# Patient Record
Sex: MALE | Race: BLACK OR AFRICAN AMERICAN | NOT HISPANIC OR LATINO | Employment: FULL TIME | URBAN - METROPOLITAN AREA
[De-identification: names, ages, dates, MRNs, and addresses within clinical notes are randomized per-mention and may not be internally consistent; named-entity substitution may affect disease eponyms.]

---

## 2023-06-06 ENCOUNTER — OFFICE VISIT (OUTPATIENT)
Dept: PRIMARY CARE | Facility: CLINIC | Age: 63
End: 2023-06-06
Payer: COMMERCIAL

## 2023-06-06 VITALS
BODY MASS INDEX: 36.87 KG/M2 | OXYGEN SATURATION: 97 % | WEIGHT: 272.2 LBS | SYSTOLIC BLOOD PRESSURE: 131 MMHG | HEART RATE: 79 BPM | TEMPERATURE: 97.1 F | DIASTOLIC BLOOD PRESSURE: 87 MMHG | HEIGHT: 72 IN

## 2023-06-06 DIAGNOSIS — N52.9 ERECTILE DYSFUNCTION, UNSPECIFIED ERECTILE DYSFUNCTION TYPE: ICD-10-CM

## 2023-06-06 DIAGNOSIS — E78.5 DYSLIPIDEMIA: ICD-10-CM

## 2023-06-06 DIAGNOSIS — J30.9 ALLERGIC RHINITIS, UNSPECIFIED SEASONALITY, UNSPECIFIED TRIGGER: ICD-10-CM

## 2023-06-06 DIAGNOSIS — I10 HYPERTENSION, UNSPECIFIED TYPE: Primary | ICD-10-CM

## 2023-06-06 PROCEDURE — 3075F SYST BP GE 130 - 139MM HG: CPT | Performed by: STUDENT IN AN ORGANIZED HEALTH CARE EDUCATION/TRAINING PROGRAM

## 2023-06-06 PROCEDURE — 1036F TOBACCO NON-USER: CPT | Performed by: STUDENT IN AN ORGANIZED HEALTH CARE EDUCATION/TRAINING PROGRAM

## 2023-06-06 PROCEDURE — 99213 OFFICE O/P EST LOW 20 MIN: CPT | Performed by: STUDENT IN AN ORGANIZED HEALTH CARE EDUCATION/TRAINING PROGRAM

## 2023-06-06 PROCEDURE — 3079F DIAST BP 80-89 MM HG: CPT | Performed by: STUDENT IN AN ORGANIZED HEALTH CARE EDUCATION/TRAINING PROGRAM

## 2023-06-06 RX ORDER — FLUTICASONE PROPIONATE 50 MCG
2 SPRAY, SUSPENSION (ML) NASAL DAILY
Qty: 16 G | Refills: 6 | Status: SHIPPED | OUTPATIENT
Start: 2023-06-06 | End: 2024-06-05

## 2023-06-06 RX ORDER — LOSARTAN POTASSIUM AND HYDROCHLOROTHIAZIDE 25; 100 MG/1; MG/1
1 TABLET ORAL DAILY
Qty: 90 TABLET | Refills: 3 | Status: SHIPPED | OUTPATIENT
Start: 2023-06-06 | End: 2024-06-05

## 2023-06-06 RX ORDER — ATORVASTATIN CALCIUM 40 MG/1
40 TABLET, FILM COATED ORAL DAILY
Qty: 90 TABLET | Refills: 3 | Status: SHIPPED | OUTPATIENT
Start: 2023-06-06 | End: 2024-06-05

## 2023-06-06 RX ORDER — TADALAFIL 20 MG/1
20 TABLET ORAL DAILY PRN
Qty: 90 TABLET | Refills: 3 | Status: SHIPPED | OUTPATIENT
Start: 2023-06-06 | End: 2024-06-05

## 2023-06-06 RX ORDER — SILDENAFIL CITRATE 20 MG/1
20 TABLET ORAL DAILY
Qty: 90 TABLET | Refills: 3 | Status: SHIPPED | OUTPATIENT
Start: 2023-06-06 | End: 2024-06-05

## 2023-06-06 RX ORDER — PRAVASTATIN SODIUM 40 MG/1
1 TABLET ORAL NIGHTLY
COMMUNITY
Start: 2020-03-26 | End: 2023-06-06

## 2023-06-06 RX ORDER — CETIRIZINE HYDROCHLORIDE 10 MG/1
10 TABLET ORAL DAILY
Qty: 90 TABLET | Refills: 3 | Status: SHIPPED | OUTPATIENT
Start: 2023-06-06 | End: 2024-06-05

## 2023-06-06 RX ORDER — LOSARTAN POTASSIUM AND HYDROCHLOROTHIAZIDE 25; 100 MG/1; MG/1
1 TABLET ORAL DAILY
COMMUNITY
Start: 2023-02-04 | End: 2023-06-06 | Stop reason: SDUPTHER

## 2023-06-06 ASSESSMENT — PAIN SCALES - GENERAL: PAINLEVEL: 0-NO PAIN

## 2023-06-06 NOTE — PROGRESS NOTES
Subjective   Patient ID: Justin Arango is a 62 y.o. male who presents for Med Refill.    HPI   Patient is here today for a medication refill: Sildenafil, tadalafil, losartan/hydrochlorothiazide, and pravastatin   And he ais also complaining of Sx of nasal drainage     # Nasal drainage:   Sx started past 2 months   Runny nose, congestion, itching   Tried OTC allegra with partial improvement     Review of Systems  Review of systems is negative besides what is mentioned in the HPI      Objective   /87 (BP Location: Left arm, Patient Position: Sitting, BP Cuff Size: Adult long)   Pulse 79   Temp 36.2 °C (97.1 °F) (Temporal)   Ht 1.829 m (6')   Wt 123 kg (272 lb 3.2 oz)   SpO2 97%   BMI 36.92 kg/m²     Physical Exam  General: Alert and oriented*3, not in acute distress   Cardiac: S1, S2 normal, no murmurs, no lower extremity edema.  Respiratory: CTAB, no wheezing or crackles   HEENT: PEERLA, nose with with pinks mucosa, swollen turbinates    Psych: appropriate mood and affect to the clinical setting      Assessment/Plan   Mr. Arango is 62 years old who is here today for medication refills and to address the issue of nasal drainage concerning for allergic rhinitis.     # AR:   - start Flonase, and Loratadine     # Medication refills:   - discussed refilling Sildenfil and tadalafil and advised patient that he should never take together   - however I refilled both meds mally patient states that he takes both medications in alternation for benefit   - other meds refilled per patient request     Patient's HPI, physical exam and plan of care was discussed with the attending physician Dr. Julissa Olivarez MD  Family Medicine, PGY-3  [facility]

## 2023-06-08 NOTE — PROGRESS NOTES
I reviewed with the resident the medical history and the resident’s findings on physical examination.  I discussed with the resident the patient’s diagnosis and concur with the treatment plan as documented in the resident note.     Darryl Costa MD

## 2023-07-17 ENCOUNTER — OFFICE VISIT (OUTPATIENT)
Dept: PRIMARY CARE | Facility: CLINIC | Age: 63
End: 2023-07-17
Payer: COMMERCIAL

## 2023-07-17 VITALS
DIASTOLIC BLOOD PRESSURE: 98 MMHG | HEIGHT: 72 IN | HEART RATE: 96 BPM | OXYGEN SATURATION: 98 % | SYSTOLIC BLOOD PRESSURE: 133 MMHG | BODY MASS INDEX: 36.98 KG/M2 | WEIGHT: 273 LBS | TEMPERATURE: 97.3 F

## 2023-07-17 DIAGNOSIS — J01.90 ACUTE BACTERIAL SINUSITIS: Primary | ICD-10-CM

## 2023-07-17 DIAGNOSIS — B96.89 ACUTE BACTERIAL SINUSITIS: Primary | ICD-10-CM

## 2023-07-17 PROBLEM — G47.00 INSOMNIA: Status: ACTIVE | Noted: 2023-07-17

## 2023-07-17 PROBLEM — N52.9 ERECTILE DYSFUNCTION: Status: ACTIVE | Noted: 2023-07-17

## 2023-07-17 PROBLEM — J30.9 ALLERGIC RHINITIS: Status: ACTIVE | Noted: 2023-07-17

## 2023-07-17 PROBLEM — K59.00 CONSTIPATION: Status: ACTIVE | Noted: 2023-07-17

## 2023-07-17 PROBLEM — E78.5 HYPERLIPIDEMIA: Status: ACTIVE | Noted: 2023-07-17

## 2023-07-17 PROBLEM — N42.9 PROSTATE DISORDER: Status: ACTIVE | Noted: 2023-07-17

## 2023-07-17 PROBLEM — L30.9 ECZEMA: Status: ACTIVE | Noted: 2023-07-17

## 2023-07-17 PROBLEM — I10 HYPERTENSION: Status: ACTIVE | Noted: 2023-07-17

## 2023-07-17 PROCEDURE — 3080F DIAST BP >= 90 MM HG: CPT

## 2023-07-17 PROCEDURE — 1036F TOBACCO NON-USER: CPT

## 2023-07-17 PROCEDURE — 3075F SYST BP GE 130 - 139MM HG: CPT

## 2023-07-17 PROCEDURE — 99213 OFFICE O/P EST LOW 20 MIN: CPT

## 2023-07-17 RX ORDER — AMOXICILLIN AND CLAVULANATE POTASSIUM 875; 125 MG/1; MG/1
875 TABLET, FILM COATED ORAL 2 TIMES DAILY
Qty: 14 TABLET | Refills: 0 | Status: SHIPPED | OUTPATIENT
Start: 2023-07-17 | End: 2023-07-24

## 2023-07-17 ASSESSMENT — ENCOUNTER SYMPTOMS
CARDIOVASCULAR NEGATIVE: 1
ENDOCRINE NEGATIVE: 1
RHINORRHEA: 0
VOICE CHANGE: 0
RESPIRATORY NEGATIVE: 1
MUSCULOSKELETAL NEGATIVE: 1
GASTROINTESTINAL NEGATIVE: 1
CONSTITUTIONAL NEGATIVE: 1
TROUBLE SWALLOWING: 0
SORE THROAT: 0
ALLERGIC/IMMUNOLOGIC NEGATIVE: 1
HEMATOLOGIC/LYMPHATIC NEGATIVE: 1
FACIAL SWELLING: 0
PSYCHIATRIC NEGATIVE: 1
SINUS PRESSURE: 1
SINUS PAIN: 0
NEUROLOGICAL NEGATIVE: 1

## 2023-07-17 ASSESSMENT — PAIN SCALES - GENERAL: PAINLEVEL: 0-NO PAIN

## 2023-07-17 NOTE — PROGRESS NOTES
Subjective   Patient ID: Justin Arango is a 62 y.o. male who presents for Follow-up (Sinus injection).    HPI   Thinks has sinus infection, gotten progressively worse since was last seen for allergic rhinitis 1 months  - Congestion that unable to get rid of with medications   - blowing nose more often with production of thick yellow sputum/mucus   - had headache yesterday took aleve and helped   - denies fever/chills  - sweating from face more    Review of Systems   Constitutional: Negative.    HENT:  Positive for congestion, sinus pressure and sneezing. Negative for dental problem, drooling, ear discharge, ear pain, facial swelling, hearing loss, mouth sores, nosebleeds, postnasal drip, rhinorrhea, sinus pain, sore throat, tinnitus, trouble swallowing and voice change.    Respiratory: Negative.     Cardiovascular: Negative.    Gastrointestinal: Negative.    Endocrine: Negative.    Genitourinary: Negative.    Musculoskeletal: Negative.    Skin: Negative.    Allergic/Immunologic: Negative.    Neurological: Negative.    Hematological: Negative.    Psychiatric/Behavioral: Negative.         Objective   BP (!) 133/98 (BP Location: Right arm, Patient Position: Sitting, BP Cuff Size: Adult long)   Pulse 96   Temp 36.3 °C (97.3 °F) (Temporal)   Ht 1.829 m (6')   Wt 124 kg (273 lb)   SpO2 98%   BMI 37.03 kg/m²       Physical Exam  Constitutional:       Appearance: He is obese.   HENT:      Head: Normocephalic and atraumatic.      Right Ear: External ear normal.      Left Ear: External ear normal.      Nose: Congestion present. No nasal tenderness.      Right Nostril: No foreign body, epistaxis, septal hematoma or occlusion.      Left Nostril: No foreign body, epistaxis, septal hematoma or occlusion.      Right Sinus: No maxillary sinus tenderness or frontal sinus tenderness.      Left Sinus: No maxillary sinus tenderness or frontal sinus tenderness.      Mouth/Throat:      Lips: Pink.      Mouth: Mucous membranes are  moist.      Pharynx: Oropharynx is clear. Uvula midline. No pharyngeal swelling, oropharyngeal exudate, posterior oropharyngeal erythema or uvula swelling.   Eyes:      Conjunctiva/sclera: Conjunctivae normal.   Cardiovascular:      Rate and Rhythm: Normal rate and regular rhythm.      Pulses: Normal pulses.      Heart sounds: Normal heart sounds.   Pulmonary:      Effort: Pulmonary effort is normal.      Breath sounds: Normal breath sounds.   Musculoskeletal:      Cervical back: Normal range of motion and neck supple.   Neurological:      General: No focal deficit present.      Mental Status: He is alert and oriented to person, place, and time.   Psychiatric:         Mood and Affect: Mood normal.         Behavior: Behavior normal.         Thought Content: Thought content normal.         Judgment: Judgment normal.       Assessment/Plan   Justin Arango is 61 y/o M w/ HTN, HLD, Eczema, ED, and allergic rhinitis who was determined to have Acute bacterial sinusitis (worsening sinusitis and productive of yellow mucus/sputmu) and was started on Augmentin therapy for 7 day (14 doses).  Plan below    Diagnoses and all orders for this visit:  Acute bacterial sinusitis  -     amoxicillin-pot clavulanate (Augmentin) 875-125 mg tablet; Take 1 tablet (875 mg) by mouth 2 times a day for 7 days.  Other orders  -     Follow Up In Primary Care - Established; Future    *Patient was found to have elevated Diastolic blood pressure, which was note and discussed with the patient.  Patient was asymptomatic and this was most likely secondary to his acute concern of ABS, however plan on following up with the patient in 1 month for BP check.  If continues to be elevated, then can consider work up for LIZETH along with adjustment of blood pressure medications.    **RTC in 1 month for BP check    Patient was discussed with Dr. Aaron Aviles MD MS  PGY-2 Family Medicine   [facility]

## 2023-07-18 NOTE — PROGRESS NOTES
I reviewed the resident's documentation and discussed the patient with the resident. I agree with the resident's medical decision making as documented in the note.     Claudia Walker MD

## 2024-05-10 ENCOUNTER — APPOINTMENT (OUTPATIENT)
Dept: PRIMARY CARE | Facility: CLINIC | Age: 64
End: 2024-05-10
Payer: COMMERCIAL

## 2024-07-03 ENCOUNTER — TELEPHONE (OUTPATIENT)
Dept: PRIMARY CARE | Facility: CLINIC | Age: 64
End: 2024-07-03

## 2024-07-03 DIAGNOSIS — I10 HYPERTENSION, UNSPECIFIED TYPE: ICD-10-CM

## 2024-07-03 DIAGNOSIS — E78.5 DYSLIPIDEMIA: ICD-10-CM

## 2024-07-03 RX ORDER — LOSARTAN POTASSIUM AND HYDROCHLOROTHIAZIDE 25; 100 MG/1; MG/1
1 TABLET ORAL DAILY
Qty: 90 TABLET | Refills: 0 | Status: SHIPPED | OUTPATIENT
Start: 2024-07-03 | End: 2024-10-01

## 2024-07-03 RX ORDER — ATORVASTATIN CALCIUM 40 MG/1
40 TABLET, FILM COATED ORAL DAILY
Qty: 90 TABLET | Refills: 0 | Status: SHIPPED | OUTPATIENT
Start: 2024-07-03 | End: 2024-10-01

## 2024-07-03 NOTE — TELEPHONE ENCOUNTER
Pt came in asking for a refill on their bp med [facility]. [phone] thank you!

## 2024-07-03 NOTE — TELEPHONE ENCOUNTER
Sent in refill of the patient's blood pressure medication and atorvastatin.  If someone would call the patient and schedule an appointment that would be great.  He has not been seen since last summer, and should follow up on his chronic medical conditions.    HIRA Aviles MD MS  PGY-3 Family Medicine

## 2024-09-12 ENCOUNTER — OFFICE VISIT (OUTPATIENT)
Dept: PRIMARY CARE | Facility: HOSPITAL | Age: 64
End: 2024-09-12
Payer: COMMERCIAL

## 2024-09-12 VITALS
HEART RATE: 97 BPM | WEIGHT: 267 LBS | OXYGEN SATURATION: 97 % | DIASTOLIC BLOOD PRESSURE: 87 MMHG | TEMPERATURE: 97.1 F | HEIGHT: 72 IN | SYSTOLIC BLOOD PRESSURE: 137 MMHG | BODY MASS INDEX: 36.16 KG/M2

## 2024-09-12 DIAGNOSIS — J30.9 ALLERGIC RHINITIS, UNSPECIFIED SEASONALITY, UNSPECIFIED TRIGGER: ICD-10-CM

## 2024-09-12 DIAGNOSIS — I10 HYPERTENSION, UNSPECIFIED TYPE: ICD-10-CM

## 2024-09-12 DIAGNOSIS — N52.9 ERECTILE DYSFUNCTION, UNSPECIFIED ERECTILE DYSFUNCTION TYPE: ICD-10-CM

## 2024-09-12 PROCEDURE — 3079F DIAST BP 80-89 MM HG: CPT

## 2024-09-12 PROCEDURE — 99213 OFFICE O/P EST LOW 20 MIN: CPT | Mod: GC

## 2024-09-12 PROCEDURE — 3008F BODY MASS INDEX DOCD: CPT

## 2024-09-12 PROCEDURE — 3075F SYST BP GE 130 - 139MM HG: CPT

## 2024-09-12 PROCEDURE — 1036F TOBACCO NON-USER: CPT

## 2024-09-12 PROCEDURE — 99213 OFFICE O/P EST LOW 20 MIN: CPT

## 2024-09-12 RX ORDER — TADALAFIL 20 MG/1
20 TABLET ORAL DAILY PRN
Qty: 90 TABLET | Refills: 3 | Status: SHIPPED | OUTPATIENT
Start: 2024-09-12 | End: 2025-09-12

## 2024-09-12 RX ORDER — BENZONATATE 100 MG/1
100 CAPSULE ORAL 3 TIMES DAILY PRN
Qty: 42 CAPSULE | Refills: 0 | Status: SHIPPED | OUTPATIENT
Start: 2024-09-12 | End: 2024-10-12

## 2024-09-12 RX ORDER — FLUTICASONE PROPIONATE 50 MCG
2 SPRAY, SUSPENSION (ML) NASAL DAILY
Qty: 16 G | Refills: 6 | Status: SHIPPED | OUTPATIENT
Start: 2024-09-12 | End: 2025-09-12

## 2024-09-12 ASSESSMENT — COLUMBIA-SUICIDE SEVERITY RATING SCALE - C-SSRS
6. HAVE YOU EVER DONE ANYTHING, STARTED TO DO ANYTHING, OR PREPARED TO DO ANYTHING TO END YOUR LIFE?: NO
2. HAVE YOU ACTUALLY HAD ANY THOUGHTS OF KILLING YOURSELF?: NO
1. IN THE PAST MONTH, HAVE YOU WISHED YOU WERE DEAD OR WISHED YOU COULD GO TO SLEEP AND NOT WAKE UP?: NO

## 2024-09-12 ASSESSMENT — ENCOUNTER SYMPTOMS
OCCASIONAL FEELINGS OF UNSTEADINESS: 0
LOSS OF SENSATION IN FEET: 0
DEPRESSION: 0

## 2024-09-12 ASSESSMENT — PATIENT HEALTH QUESTIONNAIRE - PHQ9
1. LITTLE INTEREST OR PLEASURE IN DOING THINGS: NOT AT ALL
2. FEELING DOWN, DEPRESSED OR HOPELESS: NOT AT ALL
SUM OF ALL RESPONSES TO PHQ9 QUESTIONS 1 AND 2: 0

## 2024-09-12 ASSESSMENT — PAIN SCALES - GENERAL: PAINLEVEL: 0-NO PAIN

## 2024-09-12 NOTE — PROGRESS NOTES
I reviewed the resident/fellow's documentation and discussed the patient with the resident/fellow. I agree with the resident/fellow's medical decision making as documented in the note.   Follow up in [facility].    Tariq Delgado MD

## 2024-09-12 NOTE — PROGRESS NOTES
HPI   Justin BARRERA Body is 61 y/o M with PMHx of HTN, HLD, Eczema, ED, and allergic rhinitis who presented to [facility] today to establish primary care, patient was following with the family medicine clinic/[facility].     Per chart review patient had recurrent bacterial sinusitis and was treated with courses of antibiotics. Today patient reported he had a sinus infection 4 weeks ago and was started on 7 day course of antibiotic at the [facility], he does not know the name of the antibiotic, he reported his runny nose and cough are getting better, he still has clear nasal secretions bilaterally, he denied any fever, chills, headache, dizziness, n/v/d, abdominal pain, SOB, CP, or urinary symptoms.      Patient reported he at least has two bacterial sinusitis every year for the last 15 years. He works at  with [employer]. 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     Patient requested a refill of home medications     ROS: 12 point ROS negative unless as per HPI     PMH:  HTN  HLD  Eczema  ED  Allergic rhinitis       PSH:  None    Family History:  Non contributory     Social History:  Lives: Wife   Smoking: Denied   Alcohol: Social   Drugs: Denied         Physical Exam     Visit Vitals  /87 (BP Location: Left arm, Patient Position: Sitting, BP Cuff Size: Adult)   Pulse 97   Temp 36.2 °C (97.1 °F) (Temporal)   Ht 1.829 m (6')   Wt 121 kg (267 lb)   SpO2 97%   BMI 36.21 kg/m²   Smoking Status Never   BSA 2.48 m²      Constitutional:       General: He is not in acute distress.      Appearance: He is not diaphoretic.   Eyes:      General: No scleral icterus.     Conjunctiva/sclera: Conjunctivae normal.      Pupils: Pupils are equal, round, and reactive to light.   Cardiovascular:      Rate and Rhythm: Normal rate and regular rhythm.      Pulses: Normal pulses.   Pulmonary:      Breath sounds:  Normal Lung sounds  Abdominal:      General: Bowel sounds are normal. There is no distension.      Palpations: Abdomen is soft.      Tenderness: There is no guarding.   Musculoskeletal:      Right lower leg: No edema.      Left lower leg: No edema.   Skin:     Coloration: Skin is not jaundiced or pale.   Neurological:      Comments: A&O x3, no neurological deficit    Medications     Allergies  No Known Allergies     Current Outpatient Medications   Medication Instructions    atorvastatin (LIPITOR) 40 mg, oral, Daily, Needs to be seen for refills    cetirizine (ZYRTEC) 10 mg, oral, Daily    fluticasone (Flonase) 50 mcg/actuation nasal spray 2 sprays, Each Nostril, Daily, Shake gently. Before first use, prime pump. After use, clean tip and replace cap.    losartan-hydrochlorothiazide (Hyzaar) 100-25 mg tablet 1 tablet, oral, Daily, Needs to be seen for refills    sildenafil (REVATIO) 20 mg, oral, Daily    tadalafil (CIALIS) 20 mg, oral, Daily PRN        Recent Labs     Lab Results   Component Value Date    HGBA1C 6.1 (A) 03/19/2022    CHOL 194 03/19/2022    LDLF 128 (H) 03/19/2022    HDL 37.6 (A) 03/19/2022    AST 28 03/19/2022    ALT 31 03/19/2022    BILITOT 0.6 03/19/2022        Lab Results   Component Value Date    WBC 4.3 (L) 02/27/2021    HGB 14.9 02/27/2021    HCT 45.2 02/27/2021    MCV 91 02/27/2021     02/27/2021          Chemistry    Lab Results   Component Value Date/Time     03/19/2022 0943    K 4.4 03/19/2022 0943     03/19/2022 0943    CO2 29 03/19/2022 0943    BUN 10 03/19/2022 0943    CREATININE 1.08 03/19/2022 0943    Lab Results   Component Value Date/Time    CALCIUM 9.4 03/19/2022 0943    ALKPHOS 46 03/19/2022 0943    AST 28 03/19/2022 0943    ALT 31 03/19/2022 0943    BILITOT 0.6 03/19/2022 0943             Assessment/Plan    Justin BARRERA Body is 61 y/o M with PMHx of HTN, HLD, Eczema, ED, and allergic rhinitis who presented to [facility] today to establish primary care, patient [facility]  [facility]     Addressed Today:  - Patient is having recurrent bacterial sinusitis for for the last 15 years, patient has hx of allergic rhinitis on zyrtec and Flonase, I recommended ENT referral for evaluation, patient is exposed to carpet cleaning products for the last 15 years and does not wear a mask, I recommended wearing a face mask, which could help with his allergy, I prescribed ocean spray and tessalon perle for cough  - Patient requested not to repeat labs today since [facility] he will contact the clinic to [facility]  - I refilled home medications (Including Hyzaar, Zyrtec, Flonase and Cialis)      #HTN  :: BP today 137/87  - c/w losartan-hydrochlorothiazide (Hyzaar) 100-25 mg tablet      #HLD  :: Last lipid panel Cholesterol (194) LDL (128), Tri (140), HDL (37.6) 3/2022  - on Atorvastatin 40 mg   - Patient will [facility] his [facility]     #Eczema  #Allergic rhinitis   :: on cetirizine 10 mg and Flonase   - Patient is having recurrent bacterial sinusitis for for the last 15 years, patient has hx of allergic rhinitis on zyrtec and Flonase, I recommended ENT referral for evaluation, I prescribed ocean spray and tessalon perle for cough    #ED  -on Cialis 20 mg (refilled today)        #Health Maintenance  DEXA: Not indicated   AAA screening: Not indicated   Cancer Screening  Colorectal Cancer Screening: Negative 4/2018 , recommended repeat colonoscopy in 10 years   Lung Cancer Screening: not indicated   Prostate Cancer screening: Will address next visit      Laboratory Screening  Lipid Screen: Cholesterol (194) LDL (128), Tri (140), HDL (37.6) 3/2022  ASCVD Score: [facility] will [facility], will address next visit   A1C, glucose screen: 6.1 (3/2022), patient will [facility] records [facility] the [facility], will address next visit   Syphilis: Patient will bring records [facility] the [facility], will address next visit   HIV:  Patient will bring [facility], will address next visit   Gonorrhea and chlamydia: Patient will bring records [facility] the [facility], will address next visit   Hep B screen: Patient will bring records [facility], will address next visit   Hep C screen: Patient will bring records [facility], will address next visit       Immunizations  Influenza: Refused at today visit   COVID: 2x (4/2021)  Tdap: Last done vaccine patient [facility] vaccinated [facility], will bring the records   Prevnar/Pneumovax: patient was vaccinated [facility], will bring the records   Shingrix: Unsure if the patient received it the [facility]       Referrals: ENT   RTC in 3 months     Patient and plan discussed with attending physician Dr. Danny Freeman MD  Internal Medicine   [facility]   [phone]

## 2024-09-12 NOTE — PATIENT INSTRUCTIONS
Dear Mr. Arango     It has been a pleasure taking care of you today      As discussed today, our plan is:  1. I understand you would like to send us [facility] instead of repeating the labs today since you just [facility]  2.  I referred you to the ENT specialist for your recurrent sinus infections,  you will receive a call back to schedule the appointment, if you did not receive a call back please call [phone] to schedule your appointment.  3. I refilled your medications   4.  Please come back to see me in: 3 months or as needed        If you have any problems or questions, please contact the clinic at [phone] to leave a message. Our fax number is [phone]. If your issue cannot wait until the next business day, please go to urgent care or the emergency department.  I also strongly urge all of my patients to register for [email or web address] by going to: [email or web address]  (The  staff can also send you a text/email link to register when you check out).     Aaron Freeman MD  Internal Medicine   [facility]   [phone]

## 2024-10-01 ENCOUNTER — OFFICE VISIT (OUTPATIENT)
Dept: URGENT CARE | Age: 64
End: 2024-10-01
Payer: COMMERCIAL

## 2024-10-01 VITALS
RESPIRATION RATE: 19 BRPM | HEART RATE: 73 BPM | SYSTOLIC BLOOD PRESSURE: 164 MMHG | DIASTOLIC BLOOD PRESSURE: 109 MMHG | TEMPERATURE: 98.1 F | WEIGHT: 168 LBS | BODY MASS INDEX: 22.78 KG/M2 | OXYGEN SATURATION: 96 %

## 2024-10-01 DIAGNOSIS — I10 ELEVATED SYSTOLIC BLOOD PRESSURE READING WITH DIAGNOSIS OF HYPERTENSION: ICD-10-CM

## 2024-10-01 DIAGNOSIS — M54.50 LEFT-SIDED LOW BACK PAIN WITHOUT SCIATICA, UNSPECIFIED CHRONICITY: ICD-10-CM

## 2024-10-01 DIAGNOSIS — Z76.0 ENCOUNTER FOR MEDICATION REFILL: ICD-10-CM

## 2024-10-01 DIAGNOSIS — R30.0 DYSURIA: Primary | ICD-10-CM

## 2024-10-01 LAB
POC BILIRUBIN, URINE: NEGATIVE
POC BLOOD, URINE: NEGATIVE
POC GLUCOSE, URINE: NEGATIVE MG/DL
POC KETONES, URINE: NEGATIVE MG/DL
POC LEUKOCYTES, URINE: NEGATIVE
POC NITRITE,URINE: NEGATIVE
POC PH, URINE: 6 PH
POC PROTEIN, URINE: NEGATIVE MG/DL
POC SPECIFIC GRAVITY, URINE: 1.01
POC UROBILINOGEN, URINE: 0.2 EU/DL

## 2024-10-01 PROCEDURE — 81003 URINALYSIS AUTO W/O SCOPE: CPT | Performed by: STUDENT IN AN ORGANIZED HEALTH CARE EDUCATION/TRAINING PROGRAM

## 2024-10-01 PROCEDURE — 3080F DIAST BP >= 90 MM HG: CPT | Performed by: STUDENT IN AN ORGANIZED HEALTH CARE EDUCATION/TRAINING PROGRAM

## 2024-10-01 PROCEDURE — 87086 URINE CULTURE/COLONY COUNT: CPT

## 2024-10-01 PROCEDURE — 1036F TOBACCO NON-USER: CPT | Performed by: STUDENT IN AN ORGANIZED HEALTH CARE EDUCATION/TRAINING PROGRAM

## 2024-10-01 PROCEDURE — 3077F SYST BP >= 140 MM HG: CPT | Performed by: STUDENT IN AN ORGANIZED HEALTH CARE EDUCATION/TRAINING PROGRAM

## 2024-10-01 PROCEDURE — 99214 OFFICE O/P EST MOD 30 MIN: CPT | Performed by: STUDENT IN AN ORGANIZED HEALTH CARE EDUCATION/TRAINING PROGRAM

## 2024-10-01 RX ORDER — SULFAMETHOXAZOLE AND TRIMETHOPRIM 800; 160 MG/1; MG/1
1 TABLET ORAL 2 TIMES DAILY
Qty: 14 TABLET | Refills: 0 | Status: SHIPPED | OUTPATIENT
Start: 2024-10-01 | End: 2024-10-08

## 2024-10-01 RX ORDER — LOSARTAN POTASSIUM AND HYDROCHLOROTHIAZIDE 25; 100 MG/1; MG/1
1 TABLET ORAL DAILY
Qty: 90 TABLET | Refills: 0 | Status: SHIPPED | OUTPATIENT
Start: 2024-10-01 | End: 2024-12-30

## 2024-10-01 ASSESSMENT — ENCOUNTER SYMPTOMS
FREQUENCY: 1
BACK PAIN: 1

## 2024-10-01 NOTE — PATIENT INSTRUCTIONS
Please follow up with your primary provider within one week if symptoms do not improve.  You may schedule an appointment online at [email or web address] or call [phone]. Go to the Emergency Department if symptoms significantly worsen or if you develop symptoms including but not limited to abdominal/back/flank pain, vomiting and unable to tolerate oral intake, fever/chills, inability to urinate, chest pain or shortness of breath.      We will call if a change in treatment is needed based on urine culture results in 2-3 days.

## 2024-10-01 NOTE — PROGRESS NOTES
Subjective   Patient ID: Justin Arango is a 63 y.o. male. They present today with a chief complaint of Other (UTI).    History of Present Illness  Pt presents with UTI concern. Reports 4-5 days of frequency and urgency. Hx of recurrent UTI and current sx feel similar. Last UTI about 1 year ago. States he has left low back pain as well, this is a chronic intermittent issue for him. No trauma. Pain is intermittent, non radiating, radiated 7/10. Improves with salon pas patch, worsens with certain movement such as moving positions in bed. Denies hx of nephrolithiasis. Denies concern for STI, he is in monogamous relationship with his wife, no protection used. Declines STI testing today. Denies fever chills abd pain flank pain nvd hematuria dysuria testicular pain or swelling penile discharge/itching/sores, pain with ejaculation constipation saddle anesthesia numbness tingling weakness incontinence of bowel or bladder urinary retention.           Past Medical History  Allergies as of 10/01/2024    (No Known Allergies)       (Not in a hospital admission)       Past Medical History:   Diagnosis Date    Acute upper respiratory infection, unspecified 10/28/2014    Acute upper respiratory infection    Insect bite (nonvenomous) of unspecified forearm, initial encounter (CODE) 07/09/2013    Nonvenomous insect bite of forearm    Other conditions influencing health status 07/09/2013    Nonvenomous Insect Bite Of Left Upper Arm    Personal history of other diseases of male genital organs 01/17/2022    History of discharge from penis    Personal history of other diseases of the digestive system 02/16/2014    History of gastroenteritis    Personal history of other diseases of the respiratory system 01/12/2017    History of acute sinusitis       Past Surgical History:   Procedure Laterality Date    OTHER SURGICAL HISTORY  03/26/2015    Prior Surgical Procedure Not Done        reports that he has never smoked. He has never been exposed  to tobacco smoke. He has never used smokeless tobacco. He reports that he does not drink alcohol and does not use drugs.    Review of Systems  Review of Systems   Genitourinary:  Positive for frequency and urgency.   Musculoskeletal:  Positive for back pain.   All other systems reviewed and are negative.                                 Objective    Vitals:    10/01/24 1622   BP: (!) 164/109   Pulse: 73   Resp: 19   Temp: 36.7 °C (98.1 °F)   SpO2: 96%   Weight: 76.2 kg (168 lb)     No LMP for male patient.    Physical Exam  Vitals and nursing note reviewed.   Constitutional:       General: He is not in acute distress.     Appearance: Normal appearance. He is not ill-appearing, toxic-appearing or diaphoretic.   HENT:      Head: Normocephalic and atraumatic.      Mouth/Throat:      Mouth: Mucous membranes are moist.   Cardiovascular:      Rate and Rhythm: Normal rate and regular rhythm.      Pulses: Normal pulses.      Heart sounds: No murmur heard.  Pulmonary:      Effort: Pulmonary effort is normal.      Breath sounds: No wheezing, rhonchi or rales.   Abdominal:      General: Bowel sounds are normal. There is no distension.      Palpations: Abdomen is soft.      Tenderness: There is no abdominal tenderness. There is no right CVA tenderness, left CVA tenderness, guarding or rebound.      Hernia: No hernia is present.   Musculoskeletal:      Cervical back: Normal.      Thoracic back: Normal.      Lumbar back: Tenderness (paravertebral) present. No swelling, edema, signs of trauma, spasms or bony tenderness. Normal range of motion. Negative right straight leg raise test and negative left straight leg raise test.   Skin:     Capillary Refill: Capillary refill takes less than 2 seconds.      Findings: No rash.   Neurological:      General: No focal deficit present.      Mental Status: He is alert and oriented to person, place, and time.         Procedures    Point of Care Test & Imaging Results from this visit  Results  for orders placed or performed in visit on 10/01/24   POCT UA Automated manually resulted   Result Value Ref Range    POC Glucose, Urine NEGATIVE NEGATIVE mg/dl    POC Bilirubin, Urine NEGATIVE NEGATIVE    POC Ketones, Urine NEGATIVE NEGATIVE mg/dl    POC Specific Gravity, Urine 1.015 1.005 - 1.035    POC Blood, Urine NEGATIVE NEGATIVE    POC PH, Urine 6.0 No Reference Range Established PH    POC Protein, Urine NEGATIVE NEGATIVE, 30 (1+) mg/dl    POC Urobilinogen, Urine 0.2 0.2, 1.0 EU/DL    Poc Nitrite, Urine NEGATIVE NEGATIVE    POC Leukocytes, Urine NEGATIVE NEGATIVE      No results found.    Diagnostic study results (if any) were reviewed by Noemi Crowder PA-C.    Assessment/Plan   Allergies, medications, history, and pertinent labs/EKGs/Imaging reviewed by Noemi Crowder PA-C.     Medical Decision Making  BP elevated to 164/109, pt requesting refill of Hyzaar   Suspect MSK back pain, lower concern for nephrolithiasis, infected stone, pyelonephritis, cauda equina, dissection, acute abdomen or other  pathology such as torsion or prostatitis  Urine culture sent, pt will wait to take bactrim until results  Strict ED precautions d/w patient at length  Follow up with primary care 1 week  Pt declined STI testing today     Orders and Diagnoses  Diagnoses and all orders for this visit:  Dysuria  -     POCT UA Automated manually resulted  -     sulfamethoxazole-trimethoprim (Bactrim DS) 800-160 mg tablet; Take 1 tablet by mouth 2 times a day for 7 days.  -     Urine Culture  Left-sided low back pain without sciatica, unspecified chronicity  Elevated systolic blood pressure reading with diagnosis of hypertension  Encounter for medication refill  -     losartan-hydrochlorothiazide (Hyzaar) 100-25 mg tablet; Take 1 tablet by mouth once daily.      Medical Admin Record      Patient disposition: Home    Electronically signed by Noemi Crowder PA-C  4:49 PM

## 2024-10-02 LAB — BACTERIA UR CULT: NO GROWTH

## 2024-10-08 DIAGNOSIS — N52.9 ERECTILE DYSFUNCTION, UNSPECIFIED ERECTILE DYSFUNCTION TYPE: ICD-10-CM

## 2024-10-08 PROCEDURE — RXMED WILLOW AMBULATORY MEDICATION CHARGE

## 2024-10-08 RX ORDER — TADALAFIL 20 MG/1
20 TABLET ORAL DAILY PRN
Qty: 90 TABLET | Refills: 3 | Status: SHIPPED | OUTPATIENT
Start: 2024-10-08 | End: 2025-10-08

## 2024-10-08 NOTE — PROGRESS NOTES
Patient requested a refill of Cialis to [facility] pharmacy, request was sent

## 2024-10-10 ENCOUNTER — PHARMACY VISIT (OUTPATIENT)
Dept: PHARMACY | Facility: CLINIC | Age: 64
End: 2024-10-10
Payer: COMMERCIAL

## 2024-10-29 ENCOUNTER — APPOINTMENT (OUTPATIENT)
Dept: OTOLARYNGOLOGY | Facility: CLINIC | Age: 64
End: 2024-10-29
Payer: COMMERCIAL

## 2024-11-06 ENCOUNTER — APPOINTMENT (OUTPATIENT)
Dept: OTOLARYNGOLOGY | Facility: CLINIC | Age: 64
End: 2024-11-06
Payer: COMMERCIAL

## 2025-01-13 ENCOUNTER — OFFICE VISIT (OUTPATIENT)
Dept: PRIMARY CARE | Facility: HOSPITAL | Age: 65
End: 2025-01-13
Payer: COMMERCIAL

## 2025-01-13 VITALS
TEMPERATURE: 96.9 F | HEART RATE: 97 BPM | OXYGEN SATURATION: 96 % | HEIGHT: 72 IN | BODY MASS INDEX: 37.79 KG/M2 | WEIGHT: 279 LBS | SYSTOLIC BLOOD PRESSURE: 144 MMHG | DIASTOLIC BLOOD PRESSURE: 96 MMHG

## 2025-01-13 DIAGNOSIS — Z00.00 HEALTHCARE MAINTENANCE: ICD-10-CM

## 2025-01-13 DIAGNOSIS — E78.5 DYSLIPIDEMIA: ICD-10-CM

## 2025-01-13 DIAGNOSIS — I10 HYPERTENSION, UNSPECIFIED TYPE: ICD-10-CM

## 2025-01-13 DIAGNOSIS — E66.812 CLASS 2 OBESITY WITH BODY MASS INDEX (BMI) OF 37.0 TO 37.9 IN ADULT, UNSPECIFIED OBESITY TYPE, UNSPECIFIED WHETHER SERIOUS COMORBIDITY PRESENT: ICD-10-CM

## 2025-01-13 DIAGNOSIS — J30.9 ALLERGIC RHINITIS, UNSPECIFIED SEASONALITY, UNSPECIFIED TRIGGER: ICD-10-CM

## 2025-01-13 DIAGNOSIS — L30.9 ECZEMA, UNSPECIFIED TYPE: ICD-10-CM

## 2025-01-13 DIAGNOSIS — R73.03 PREDIABETES: Primary | ICD-10-CM

## 2025-01-13 DIAGNOSIS — M25.50 ARTHRALGIA, UNSPECIFIED JOINT: ICD-10-CM

## 2025-01-13 DIAGNOSIS — R30.0 DYSURIA: ICD-10-CM

## 2025-01-13 DIAGNOSIS — K59.00 CONSTIPATION, UNSPECIFIED CONSTIPATION TYPE: ICD-10-CM

## 2025-01-13 LAB
ALBUMIN SERPL BCP-MCNC: 4.5 G/DL (ref 3.4–5)
ALP SERPL-CCNC: 54 U/L (ref 33–136)
ALT SERPL W P-5'-P-CCNC: 31 U/L (ref 10–52)
ANION GAP SERPL CALC-SCNC: 14 MMOL/L (ref 10–20)
APPEARANCE UR: CLEAR
AST SERPL W P-5'-P-CCNC: 32 U/L (ref 9–39)
BASOPHILS # BLD AUTO: 0.05 X10*3/UL (ref 0–0.1)
BASOPHILS NFR BLD AUTO: 0.9 %
BILIRUB DIRECT SERPL-MCNC: 0.1 MG/DL (ref 0–0.3)
BILIRUB SERPL-MCNC: 0.4 MG/DL (ref 0–1.2)
BILIRUB UR STRIP.AUTO-MCNC: NEGATIVE MG/DL
BUN SERPL-MCNC: 14 MG/DL (ref 6–23)
CALCIUM SERPL-MCNC: 9.9 MG/DL (ref 8.6–10.6)
CHLORIDE SERPL-SCNC: 101 MMOL/L (ref 98–107)
CHOLEST SERPL-MCNC: 253 MG/DL (ref 0–199)
CHOLESTEROL/HDL RATIO: 5.7
CK SERPL-CCNC: 1212 U/L (ref 0–325)
CO2 SERPL-SCNC: 29 MMOL/L (ref 21–32)
COLOR UR: YELLOW
CREAT SERPL-MCNC: 1.06 MG/DL (ref 0.5–1.3)
CREAT UR-MCNC: 193.8 MG/DL (ref 20–370)
CRP SERPL-MCNC: 0.26 MG/DL
EGFRCR SERPLBLD CKD-EPI 2021: 79 ML/MIN/1.73M*2
EOSINOPHIL # BLD AUTO: 0.07 X10*3/UL (ref 0–0.7)
EOSINOPHIL NFR BLD AUTO: 1.2 %
ERYTHROCYTE [DISTWIDTH] IN BLOOD BY AUTOMATED COUNT: 13.2 % (ref 11.5–14.5)
ERYTHROCYTE [SEDIMENTATION RATE] IN BLOOD BY WESTERGREN METHOD: 32 MM/H (ref 0–20)
EST. AVERAGE GLUCOSE BLD GHB EST-MCNC: 128 MG/DL
GLUCOSE SERPL-MCNC: 88 MG/DL (ref 74–99)
GLUCOSE UR STRIP.AUTO-MCNC: NORMAL MG/DL
HBA1C MFR BLD: 6.1 %
HCT VFR BLD AUTO: 44 % (ref 41–52)
HDLC SERPL-MCNC: 44.3 MG/DL
HGB BLD-MCNC: 14.2 G/DL (ref 13.5–17.5)
IMM GRANULOCYTES # BLD AUTO: 0.01 X10*3/UL (ref 0–0.7)
IMM GRANULOCYTES NFR BLD AUTO: 0.2 % (ref 0–0.9)
KETONES UR STRIP.AUTO-MCNC: NEGATIVE MG/DL
LEUKOCYTE ESTERASE UR QL STRIP.AUTO: NEGATIVE
LYMPHOCYTES # BLD AUTO: 2.5 X10*3/UL (ref 1.2–4.8)
LYMPHOCYTES NFR BLD AUTO: 44 %
MAGNESIUM SERPL-MCNC: 2.18 MG/DL (ref 1.6–2.4)
MCH RBC QN AUTO: 29 PG (ref 26–34)
MCHC RBC AUTO-ENTMCNC: 32.3 G/DL (ref 32–36)
MCV RBC AUTO: 90 FL (ref 80–100)
MICROALBUMIN UR-MCNC: 75.7 MG/L
MICROALBUMIN/CREAT UR: 39.1 UG/MG CREAT
MONOCYTES # BLD AUTO: 0.57 X10*3/UL (ref 0.1–1)
MONOCYTES NFR BLD AUTO: 10 %
MUCOUS THREADS #/AREA URNS AUTO: NORMAL /LPF
NEUTROPHILS # BLD AUTO: 2.48 X10*3/UL (ref 1.2–7.7)
NEUTROPHILS NFR BLD AUTO: 43.7 %
NITRITE UR QL STRIP.AUTO: NEGATIVE
NON-HDL CHOLESTEROL: 209 MG/DL (ref 0–149)
NRBC BLD-RTO: 0 /100 WBCS (ref 0–0)
PH UR STRIP.AUTO: 7 [PH]
PHOSPHATE SERPL-MCNC: 4.1 MG/DL (ref 2.5–4.9)
PLATELET # BLD AUTO: 318 X10*3/UL (ref 150–450)
POTASSIUM SERPL-SCNC: 4.7 MMOL/L (ref 3.5–5.3)
PROT SERPL-MCNC: 7.6 G/DL (ref 6.4–8.2)
PROT UR STRIP.AUTO-MCNC: NORMAL MG/DL
RBC # BLD AUTO: 4.89 X10*6/UL (ref 4.5–5.9)
RBC # UR STRIP.AUTO: NEGATIVE /UL
RBC #/AREA URNS AUTO: NORMAL /HPF
SODIUM SERPL-SCNC: 139 MMOL/L (ref 136–145)
SP GR UR STRIP.AUTO: 1.03
UROBILINOGEN UR STRIP.AUTO-MCNC: NORMAL MG/DL
WBC # BLD AUTO: 5.7 X10*3/UL (ref 4.4–11.3)
WBC #/AREA URNS AUTO: NORMAL /HPF

## 2025-01-13 PROCEDURE — 82248 BILIRUBIN DIRECT: CPT

## 2025-01-13 PROCEDURE — 83735 ASSAY OF MAGNESIUM: CPT

## 2025-01-13 PROCEDURE — 3008F BODY MASS INDEX DOCD: CPT

## 2025-01-13 PROCEDURE — 99000 SPECIMEN HANDLING OFFICE-LAB: CPT

## 2025-01-13 PROCEDURE — 85652 RBC SED RATE AUTOMATED: CPT

## 2025-01-13 PROCEDURE — 3080F DIAST BP >= 90 MM HG: CPT

## 2025-01-13 PROCEDURE — 81003 URINALYSIS AUTO W/O SCOPE: CPT | Mod: GC

## 2025-01-13 PROCEDURE — 1036F TOBACCO NON-USER: CPT

## 2025-01-13 PROCEDURE — 83718 ASSAY OF LIPOPROTEIN: CPT

## 2025-01-13 PROCEDURE — 82550 ASSAY OF CK (CPK): CPT

## 2025-01-13 PROCEDURE — 86140 C-REACTIVE PROTEIN: CPT

## 2025-01-13 PROCEDURE — 84154 ASSAY OF PSA FREE: CPT

## 2025-01-13 PROCEDURE — 83036 HEMOGLOBIN GLYCOSYLATED A1C: CPT

## 2025-01-13 PROCEDURE — 99214 OFFICE O/P EST MOD 30 MIN: CPT

## 2025-01-13 PROCEDURE — 3077F SYST BP >= 140 MM HG: CPT

## 2025-01-13 PROCEDURE — 85025 COMPLETE CBC W/AUTO DIFF WBC: CPT

## 2025-01-13 PROCEDURE — 36415 COLL VENOUS BLD VENIPUNCTURE: CPT

## 2025-01-13 PROCEDURE — 81001 URINALYSIS AUTO W/SCOPE: CPT

## 2025-01-13 PROCEDURE — 84100 ASSAY OF PHOSPHORUS: CPT

## 2025-01-13 PROCEDURE — 80048 BASIC METABOLIC PNL TOTAL CA: CPT

## 2025-01-13 PROCEDURE — 99214 OFFICE O/P EST MOD 30 MIN: CPT | Mod: GC

## 2025-01-13 PROCEDURE — 82570 ASSAY OF URINE CREATININE: CPT

## 2025-01-13 RX ORDER — POLYETHYLENE GLYCOL 3350 17 G/17G
17 POWDER, FOR SOLUTION ORAL DAILY PRN
Qty: 30 PACKET | Refills: 0 | Status: SHIPPED | OUTPATIENT
Start: 2025-01-13 | End: 2025-02-12

## 2025-01-13 RX ORDER — FLUTICASONE PROPIONATE 50 MCG
2 SPRAY, SUSPENSION (ML) NASAL DAILY
Qty: 16 G | Refills: 6 | Status: SHIPPED | OUTPATIENT
Start: 2025-01-13 | End: 2026-01-13

## 2025-01-13 RX ORDER — SIMETHICONE 125 MG
125 CAPSULE ORAL 4 TIMES DAILY PRN
Qty: 28 CAPSULE | Refills: 0 | Status: SHIPPED | OUTPATIENT
Start: 2025-01-13 | End: 2025-03-14

## 2025-01-13 RX ORDER — CETIRIZINE HYDROCHLORIDE 10 MG/1
10 TABLET ORAL DAILY
Qty: 90 TABLET | Refills: 3 | Status: SHIPPED | OUTPATIENT
Start: 2025-01-13 | End: 2026-01-13

## 2025-01-13 RX ORDER — LOSARTAN POTASSIUM AND HYDROCHLOROTHIAZIDE 25; 100 MG/1; MG/1
1 TABLET ORAL DAILY
Qty: 90 TABLET | Refills: 1 | Status: SHIPPED | OUTPATIENT
Start: 2025-01-13 | End: 2025-07-12

## 2025-01-13 RX ORDER — ATORVASTATIN CALCIUM 40 MG/1
40 TABLET, FILM COATED ORAL DAILY
Qty: 90 TABLET | Refills: 0 | Status: SHIPPED | OUTPATIENT
Start: 2025-01-13 | End: 2025-01-14 | Stop reason: SINTOL

## 2025-01-13 ASSESSMENT — ENCOUNTER SYMPTOMS
OCCASIONAL FEELINGS OF UNSTEADINESS: 0
DEPRESSION: 0
LOSS OF SENSATION IN FEET: 0

## 2025-01-13 ASSESSMENT — PATIENT HEALTH QUESTIONNAIRE - PHQ9
SUM OF ALL RESPONSES TO PHQ9 QUESTIONS 1 AND 2: 0
1. LITTLE INTEREST OR PLEASURE IN DOING THINGS: NOT AT ALL
2. FEELING DOWN, DEPRESSED OR HOPELESS: NOT AT ALL

## 2025-01-13 ASSESSMENT — PAIN SCALES - GENERAL: PAINLEVEL_OUTOF10: 0-NO PAIN

## 2025-01-13 NOTE — PROGRESS NOTES
Justin Arango is 61 y/o M with PMHx of HTN, HLD, Eczema, ED, and allergic rhinitis/ Recurrent bacterial sinusitis who [place] [facility] [place] BP medication refill, and concern for bloating.    Last seen [place] [facility] [place] on 9/2024 to establish care.     Recently presented to the urgent care clinic on 1/10/2025 with concern for UTI symptoms, started empirically on bactrim DS (800-160 mg tablet); BID for 7 days, UA and urine culture showed no evidence of infection and no growth of organisms. STI testing was declined. His BP was elevated at the time 164/109, and Hyzaar was refilled.      Today he mentioned he is in a rush because he is on the clock,  He works at  with [employer]. So he wanted to try to have this as a quick visit.     [facility], but can bring it next visit ( [facility] medication list, vaccines).     Today he mentions when he presented to urgent care  he had penile irritation that started after he took a bubble bath shower before presenting to the urgent care, he did not have significant Dysuria. He is talking the prescribed antibiotic. He is monogamous with his wife and denies concern for STD and defers testing for today. He denies having any penile discharge, genital rash or lesions, denies any dysuria or increase urinary frequency and reports his symptoms improved. He does report his wife mentioned his urine is especially foul smelling at time, and frothy looking.     He noticed he has worse bloating and gas in the past month, this worsens when he has constipation. And is also worse with intake of greasy food. Reports occasional/ a couple times a month of sticky hard to flush stools, associated with greasy food, no melena. No abdominal pain. No Heartburn, N/V.     He recently has his atorvastatin dose reduce to EOD from daily due to joint pain. He reports joint pain at the bilateral knees, bilateral elbows, and  wrists. The pain is significant reported to be severe rated as more than 5 on the pain scale, can be associated with morning stiffness when it presents the stiffness is mild but per pt can last for a day. He has the pain daily. No swelling, or limitation in ROM. The pain significantly improved after the dose adjustment EOD. Denies any fever, rashes, eye symptoms.     He has generally dry skin, he uses scented lotions after shower and feels like it doesn't help.     BP today is 144/96, he ran out of his BP medication, doesn't remember when was the last dose.   Patient requested a refill of home medications.      ROS: 12 point ROS negative unless as per HPI      PMH:  HTN  HLD  Eczema  ED  Allergic rhinitis      PSH:  None     Family History:  Non contributory      Social History:  Lives: Wife   Smoking: Denied   Alcohol: Social   Drugs: Denied      Medications:  Current Outpatient Medications   Medication Instructions    atorvastatin (LIPITOR) 40 mg, oral, Daily, Needs to be seen for refills    cetirizine (ZYRTEC) 10 mg, oral, Daily    fluticasone (Flonase) 50 mcg/actuation nasal spray 2 sprays, Each Nostril, Daily, Shake gently. Before first use, prime pump. After use, clean tip and replace cap.    losartan-hydrochlorothiazide (Hyzaar) 100-25 mg tablet 1 tablet, oral, Daily, Needs to be seen for refills    losartan-hydrochlorothiazide (Hyzaar) 100-25 mg tablet 1 tablet, oral, Daily    sodium chloride (Ocean) 0.65 % nasal spray 1 spray, Each Nostril, As needed    tadalafil (CIALIS) 20 mg, oral, Daily PRN       Allergies:  No Known Allergies    Past medical history:  Past Medical History:   Diagnosis Date    Acute upper respiratory infection, unspecified 10/28/2014    Acute upper respiratory infection    Insect bite (nonvenomous) of unspecified forearm, initial encounter (CODE) 07/09/2013    Nonvenomous insect bite of forearm    Other conditions influencing health status 07/09/2013    Nonvenomous Insect Bite Of Left  Upper Arm    Personal history of other diseases of male genital organs 01/17/2022    History of discharge from penis    Personal history of other diseases of the digestive system 02/16/2014    History of gastroenteritis    Personal history of other diseases of the respiratory system 01/12/2017    History of acute sinusitis       Surgical history:  Past Surgical History:   Procedure Laterality Date    OTHER SURGICAL HISTORY  03/26/2015    Prior Surgical Procedure Not Done       Family history:  No family history on file.    Social history:   reports that he has never smoked. He has never been exposed to tobacco smoke. He has never used smokeless tobacco. He reports that he does not drink alcohol and does not use drugs.    Health maintenance:  Health Maintenance   Topic Date Due    Yearly Adult Physical  Never done    HIV Screening  Never done    MMR Vaccines (1 of 1 - Standard series) Never done    Hepatitis C Screening  Never done    Influenza Vaccine (1) 09/01/2024    COVID-19 Vaccine (1 - 2024-25 season) Never done    Lipid Panel  03/19/2027    Colorectal Cancer Screening  04/04/2028    DTaP/Tdap/Td Vaccines (3 - Td or Tdap) 02/06/2034    RSV High Risk: (Elderly (60+) or Pregnant Population) (1 - 1-dose 75+ series) 12/02/2036    Pneumococcal Vaccine  Completed    Zoster Vaccines  Completed    HIB Vaccines  Aged Out    Hepatitis B Vaccines  Aged Out    IPV Vaccines  Aged Out    Hepatitis A Vaccines  Aged Out    Meningococcal Vaccine  Aged Out    Rotavirus Vaccines  Aged Out    HPV Vaccines  Aged Out    Irritable Bowel Syndrome  Discontinued     Vitals:  There were no vitals filed for this visit.    Physical exam:  Physical Exam  Constitutional:       General: He is not in acute distress.     Appearance: Normal appearance. He is obese. He is not ill-appearing.   HENT:      Nose: Nose normal. No congestion.   Eyes:      Conjunctiva/sclera: Conjunctivae normal.   Cardiovascular:      Rate and Rhythm: Normal rate and  "regular rhythm.   Pulmonary:      Effort: Pulmonary effort is normal.      Breath sounds: Normal breath sounds.   Abdominal:      General: Abdomen is flat. Bowel sounds are normal. There is no distension.      Palpations: Abdomen is soft.      Tenderness: There is no abdominal tenderness. There is no right CVA tenderness, left CVA tenderness or guarding.   Musculoskeletal:         General: No swelling, tenderness or deformity. Normal range of motion.      Right lower leg: No edema.      Left lower leg: No edema.      Comments: On bilateral hand, wrist and bilateral knee exam, no deformity, swelling, tenderness, weakness, no skin changes or rashes appreciated. ROM is intact on all joints. And no weakness.      Skin:     General: Skin is dry.      Coloration: Skin is not jaundiced.      Findings: No bruising, erythema, lesion or rash.   Neurological:      Mental Status: He is alert and oriented to person, place, and time. Mental status is at baseline.   Psychiatric:         Mood and Affect: Mood normal.         Behavior: Behavior normal.         Thought Content: Thought content normal.         Labs:  Lab Results   Component Value Date    WBC 4.3 (L) 02/27/2021    HGB 14.9 02/27/2021    HCT 45.2 02/27/2021    MCV 91 02/27/2021     02/27/2021       Lab Results   Component Value Date    GLUCOSE 100 (H) 03/19/2022    CALCIUM 9.4 03/19/2022     03/19/2022    K 4.4 03/19/2022    CO2 29 03/19/2022     03/19/2022    BUN 10 03/19/2022    CREATININE 1.08 03/19/2022       Lab Results   Component Value Date    HGBA1C 6.1 (A) 03/19/2022        Lab Results   Component Value Date    CHOL 194 03/19/2022    CHOL 236 (H) 02/27/2021    CHOL 235 (H) 02/24/2020     Lab Results   Component Value Date    HDL 37.6 (A) 03/19/2022    HDL 34.3 (A) 02/27/2021    HDL 41.1 02/24/2020     No results found for: \"LDLCALC\"  Lab Results   Component Value Date    TRIG 140 03/19/2022    TRIG 158 (H) 02/27/2021    TRIG 144 02/24/2020 " "    No components found for: \"CHOLHDL\"    Imaging:  No results found.    Assessment and plan:   Justin Arango is 63 y/o M with PMHx of HTN, HLD, Eczema, ED, and allergic rhinitis/ Recurrent bacterial sinusitis who presented to [facility] today for BP medication refill, and concern for bloating. patient [facility]      Addressed Today:  - [facility] and was not able to bring [facility] this time, but can bring it next visit ( [facility] medication list, vaccines). We discussed the harm of possibly prescribing duplicate medications or medication interactions when having similar medication prescribed in 2 different clinics. The patient said he would like continue primary care here today and is amenable to getting blood work today.   - in regard to the concern about the diffuse joint pain the patient endorses with Atorvastatin and lessened with changing the dose to EOD as described in the HPI. To help further characterize the pain we requested a CRP, ESR, and CK. And ordered a lipid panel to help guide us in choosing alternative HLD medication. Can continue on atorvastatin EOD at this point. And will discuss after lab results.   - reported [facility], so we will follow HbA1c and will consider Ozempic or similar mediaction to address obesity in addition to diet modification.   - Diet modification and avoiding Greasy food was discussed, we also prescribed mirilax for constipation and medication for bloating.   - for dry skin we discussed using non scented moisturizing products after shower and following with petroleum gel topicals to help lock the moisture in and placed orders.  - asa he reported frothy and foul smelling urine, but denies any other symptoms, and currently is not interested in STI testing. We ordered UA and urine albumin testing and we will follow.   - I refilled home medications (Including Hyzaar, Zyrtec, Flonase and " Cialis)     #HTN  :: BP today 137/87  - c/w losartan-hydrochlorothiazide (Hyzaar) 100-25 mg tablet        #HLD  :: Last lipid panel Cholesterol (194) LDL (128), Tri (140), HDL (37.6) 3/2022  - on Atorvastatin 40 mg EOD     # prediabetes?  # Obesity  - will follow HbaA1c   - reported [facility], so we will follow HbA1c and will consider Ozempic or similar mediaction to address obesity in addition to diet modification.     #Eczema  #Allergic rhinitis   :: on cetirizine 10 mg and Flonase   - Patient is having recurrent bacterial sinusitis for for the last 15 years, patient has hx of allergic rhinitis on zyrtec and Flonase, I recommended ENT referral for evaluation. He still did not get a chance to schedule his apt, he reports his symptoms are controlled now.     - for dry skin we discussed using non scented moisturizing products after shower and following with petroleum gel topicals to help lock the moisture in and placed orders.    #ED  -on Cialis 20 mg (refilled today)           #Health Maintenance  DEXA: Not indicated   AAA screening: Not indicated   Cancer Screening  Colorectal Cancer Screening: Negative 4/2018 , recommended repeat colonoscopy in 10 years   Lung Cancer Screening: not indicated   Prostate Cancer screening: ordered PSA this visit      Laboratory Screening  Lipid Screen: Cholesterol (194) LDL (128), Tri (140), HDL (37.6) 3/2022  ASCVD Score: pending lab results this visit  A1C, glucose screen: 6.1 (3/2022), pending lab results this visit  Syphilis: [facility] will [facility], will address next visit   HIV: Patient will bring records [facility], will address next visit   Gonorrhea and chlamydia: not interested in STI testing at the time  Hep B screen: Patient will bring records [facility], will address next visit   Hep C screen: Patient will bring records [facility], will address next visit         Immunizations  Influenza: reports receiving the flu vaccine  at the [facility]  COVID: 2x (4/2021)  Tdap: Last done vaccine patient was vaccinated [facility], will bring the records   Prevnar/Pneumovax: patient was vaccinated [facility], will bring the records   Shingrix: Unsure if the patient received it the [facility]         Referrals: -  RTC in 3 months      Patient and plan discussed with attending physician Dr. TERRIE Green MD  Internal Medicine   [facility]   [phone]

## 2025-01-13 NOTE — PATIENT INSTRUCTIONS
As discussed today, our plan is:   - Refilled medication   - prescribed medication for constipation and bloating  - prescribed creams for dry skin.     Labs - we collected labs today and will call you with any abnormal results. If you have any questions or concerns prior to us calling feel free to call our office to have your questions addressed.   Medication changes: no medication changes     Please come back to see us in: 3 months   ------  If you have any problems or questions, please contact the clinic at [phone] to leave a message. Our fax number is [phone]. If your issue cannot wait until the next business day, please go to urgent care or the emergency department.     I also strongly urge all of my patients to register for [facility] by going to: [email or web address]  (The  staff can also send you a text/email link to register when you check out).    No shows: It is understandable if you are unable to make it to a visit, but please cancel your appointment instead of not showing up. This helps to give other patients access to primary care and keeps wait times low.        [facility]   [phone]

## 2025-01-14 ENCOUNTER — TELEPHONE (OUTPATIENT)
Dept: INTERNAL MEDICINE | Facility: HOSPITAL | Age: 65
End: 2025-01-14

## 2025-01-14 DIAGNOSIS — Z91.89 ENCOUNTER FOR SCREENING FOR CORONARY ARTERY DISEASE IN PATIENT WITH RISK FOR CORONARY ARTERY DISEASE GREATER THAN 20% IN NEXT 10 YEARS: ICD-10-CM

## 2025-01-14 DIAGNOSIS — Z13.6 ENCOUNTER FOR SCREENING FOR CORONARY ARTERY DISEASE IN PATIENT WITH RISK FOR CORONARY ARTERY DISEASE GREATER THAN 20% IN NEXT 10 YEARS: ICD-10-CM

## 2025-01-14 DIAGNOSIS — E78.2 MIXED HYPERLIPIDEMIA: Primary | ICD-10-CM

## 2025-01-14 LAB — HOLD SPECIMEN: NORMAL

## 2025-01-14 PROCEDURE — RXMED WILLOW AMBULATORY MEDICATION CHARGE

## 2025-01-14 RX ORDER — ROSUVASTATIN CALCIUM 10 MG/1
10 TABLET, COATED ORAL EVERY OTHER DAY
Qty: 45 TABLET | Refills: 3 | Status: SHIPPED | OUTPATIENT
Start: 2025-01-14 | End: 2026-01-14

## 2025-01-14 NOTE — PROGRESS NOTES
Lab results reviewed,  CK is increased in the setting of arthralgia and myalgia. He is on atorvastatin 40 mg every other day recently decreased by OSH PCP and that improved his symptoms.   The patients ASCVD risk is elevated 20.3% Risk of cardiovascular event in the next 10 years, and thus would benefit from staying a lipid lowering medication. And as the patients symptoms improved on dose frequency reduction. I discussed with the patient the option of changing atorvastatin to rosuvastatin low dose at 10 mg very other day. As it is associated with lower side effects.    The patient is agreeable to the plan and is informed to hold his medication and call [facility] clinic if he experiences Muscle or joint pain.   Prescribed rosuvastatin low dose at 10 mg very other day.   Will follow up in 3 months or sooner   Other labs reviewed and patient informed. Talked about diet and exercise to address pre diabetes.     Plan discussed with Dr. Rice

## 2025-01-15 ENCOUNTER — PHARMACY VISIT (OUTPATIENT)
Dept: PHARMACY | Facility: CLINIC | Age: 65
End: 2025-01-15
Payer: COMMERCIAL

## 2025-01-15 LAB
PSA FREE MFR SERPL: 27 %
PSA FREE SERPL-MCNC: 0.4 NG/ML
PSA SERPL IA-MCNC: 1.5 NG/ML (ref 0–4)

## 2025-06-23 DIAGNOSIS — I10 HYPERTENSION, UNSPECIFIED TYPE: ICD-10-CM

## 2025-06-24 RX ORDER — LOSARTAN POTASSIUM AND HYDROCHLOROTHIAZIDE 25; 100 MG/1; MG/1
TABLET ORAL
Qty: 90 TABLET | Refills: 0 | OUTPATIENT
Start: 2025-06-24

## 2025-09-03 DIAGNOSIS — N52.9 ERECTILE DYSFUNCTION, UNSPECIFIED ERECTILE DYSFUNCTION TYPE: ICD-10-CM

## 2025-09-04 RX ORDER — TADALAFIL 20 MG/1
20 TABLET ORAL DAILY PRN
Qty: 30 TABLET | Refills: 2 | Status: SHIPPED | OUTPATIENT
Start: 2025-09-04 | End: 2025-12-03